# Patient Record
Sex: MALE | Race: ASIAN | NOT HISPANIC OR LATINO | Employment: FULL TIME | URBAN - METROPOLITAN AREA
[De-identification: names, ages, dates, MRNs, and addresses within clinical notes are randomized per-mention and may not be internally consistent; named-entity substitution may affect disease eponyms.]

---

## 2023-01-15 ENCOUNTER — TELEPHONE (OUTPATIENT)
Dept: SCHEDULING | Facility: IMAGING CENTER | Age: 33
End: 2023-01-15
Payer: COMMERCIAL

## 2023-01-16 ENCOUNTER — OFFICE VISIT (OUTPATIENT)
Dept: MEDICAL GROUP | Facility: PHYSICIAN GROUP | Age: 33
End: 2023-01-16
Payer: COMMERCIAL

## 2023-01-16 ENCOUNTER — HOSPITAL ENCOUNTER (OUTPATIENT)
Dept: LAB | Facility: MEDICAL CENTER | Age: 33
End: 2023-01-16
Payer: COMMERCIAL

## 2023-01-16 VITALS
WEIGHT: 139 LBS | HEIGHT: 68 IN | OXYGEN SATURATION: 96 % | HEART RATE: 77 BPM | SYSTOLIC BLOOD PRESSURE: 104 MMHG | BODY MASS INDEX: 21.07 KG/M2 | TEMPERATURE: 98.2 F | DIASTOLIC BLOOD PRESSURE: 72 MMHG

## 2023-01-16 DIAGNOSIS — M25.60 JOINT STIFFNESS: ICD-10-CM

## 2023-01-16 DIAGNOSIS — M25.542 ARTHRALGIA OF BOTH HANDS: ICD-10-CM

## 2023-01-16 DIAGNOSIS — M25.541 ARTHRALGIA OF BOTH HANDS: ICD-10-CM

## 2023-01-16 DIAGNOSIS — Z00.00 ENCOUNTER FOR MEDICAL EXAMINATION TO ESTABLISH CARE: ICD-10-CM

## 2023-01-16 DIAGNOSIS — M25.532 ARTHRALGIA OF LEFT WRIST: ICD-10-CM

## 2023-01-16 PROBLEM — M25.50 JOINT PAIN: Status: ACTIVE | Noted: 2023-01-16

## 2023-01-16 LAB
25(OH)D3 SERPL-MCNC: 10 NG/ML (ref 30–100)
ALBUMIN SERPL BCP-MCNC: 4.6 G/DL (ref 3.2–4.9)
ALBUMIN/GLOB SERPL: 1.5 G/DL
ALP SERPL-CCNC: 88 U/L (ref 30–99)
ALT SERPL-CCNC: 19 U/L (ref 2–50)
ANION GAP SERPL CALC-SCNC: 9 MMOL/L (ref 7–16)
AST SERPL-CCNC: 20 U/L (ref 12–45)
BASOPHILS # BLD AUTO: 1 % (ref 0–1.8)
BASOPHILS # BLD: 0.08 K/UL (ref 0–0.12)
BILIRUB SERPL-MCNC: 0.3 MG/DL (ref 0.1–1.5)
BUN SERPL-MCNC: 15 MG/DL (ref 8–22)
CALCIUM ALBUM COR SERPL-MCNC: 9.3 MG/DL (ref 8.5–10.5)
CALCIUM SERPL-MCNC: 9.8 MG/DL (ref 8.5–10.5)
CHLORIDE SERPL-SCNC: 102 MMOL/L (ref 96–112)
CHOLEST SERPL-MCNC: 172 MG/DL (ref 100–199)
CO2 SERPL-SCNC: 27 MMOL/L (ref 20–33)
CREAT SERPL-MCNC: 0.94 MG/DL (ref 0.5–1.4)
EOSINOPHIL # BLD AUTO: 0.32 K/UL (ref 0–0.51)
EOSINOPHIL NFR BLD: 4.1 % (ref 0–6.9)
ERYTHROCYTE [DISTWIDTH] IN BLOOD BY AUTOMATED COUNT: 37 FL (ref 35.9–50)
GFR SERPLBLD CREATININE-BSD FMLA CKD-EPI: 109 ML/MIN/1.73 M 2
GLOBULIN SER CALC-MCNC: 3.1 G/DL (ref 1.9–3.5)
GLUCOSE SERPL-MCNC: 93 MG/DL (ref 65–99)
HCT VFR BLD AUTO: 46.9 % (ref 42–52)
HDLC SERPL-MCNC: 39 MG/DL
HGB BLD-MCNC: 15.5 G/DL (ref 14–18)
IMM GRANULOCYTES # BLD AUTO: 0.04 K/UL (ref 0–0.11)
IMM GRANULOCYTES NFR BLD AUTO: 0.5 % (ref 0–0.9)
LDLC SERPL CALC-MCNC: 104 MG/DL
LYMPHOCYTES # BLD AUTO: 2.81 K/UL (ref 1–4.8)
LYMPHOCYTES NFR BLD: 35.8 % (ref 22–41)
MCH RBC QN AUTO: 27.9 PG (ref 27–33)
MCHC RBC AUTO-ENTMCNC: 33 G/DL (ref 33.7–35.3)
MCV RBC AUTO: 84.5 FL (ref 81.4–97.8)
MONOCYTES # BLD AUTO: 0.76 K/UL (ref 0–0.85)
MONOCYTES NFR BLD AUTO: 9.7 % (ref 0–13.4)
NEUTROPHILS # BLD AUTO: 3.85 K/UL (ref 1.82–7.42)
NEUTROPHILS NFR BLD: 48.9 % (ref 44–72)
NRBC # BLD AUTO: 0 K/UL
NRBC BLD-RTO: 0 /100 WBC
PLATELET # BLD AUTO: 318 K/UL (ref 164–446)
PMV BLD AUTO: 10.2 FL (ref 9–12.9)
POTASSIUM SERPL-SCNC: 4.2 MMOL/L (ref 3.6–5.5)
PROT SERPL-MCNC: 7.7 G/DL (ref 6–8.2)
RBC # BLD AUTO: 5.55 M/UL (ref 4.7–6.1)
RHEUMATOID FACT SER IA-ACNC: <10 IU/ML (ref 0–14)
SODIUM SERPL-SCNC: 138 MMOL/L (ref 135–145)
TRIGL SERPL-MCNC: 144 MG/DL (ref 0–149)
TSH SERPL DL<=0.005 MIU/L-ACNC: 0.99 UIU/ML (ref 0.38–5.33)
WBC # BLD AUTO: 7.9 K/UL (ref 4.8–10.8)

## 2023-01-16 PROCEDURE — 82306 VITAMIN D 25 HYDROXY: CPT

## 2023-01-16 PROCEDURE — 86431 RHEUMATOID FACTOR QUANT: CPT

## 2023-01-16 PROCEDURE — 99204 OFFICE O/P NEW MOD 45 MIN: CPT

## 2023-01-16 PROCEDURE — 80061 LIPID PANEL: CPT

## 2023-01-16 PROCEDURE — 36415 COLL VENOUS BLD VENIPUNCTURE: CPT

## 2023-01-16 PROCEDURE — 85025 COMPLETE CBC W/AUTO DIFF WBC: CPT

## 2023-01-16 PROCEDURE — 84443 ASSAY THYROID STIM HORMONE: CPT

## 2023-01-16 PROCEDURE — 86200 CCP ANTIBODY: CPT

## 2023-01-16 PROCEDURE — 80053 COMPREHEN METABOLIC PANEL: CPT

## 2023-01-16 RX ORDER — MELOXICAM 7.5 MG/1
7.5 TABLET ORAL DAILY
Qty: 90 TABLET | Refills: 0 | Status: SHIPPED | OUTPATIENT
Start: 2023-01-16 | End: 2023-09-12

## 2023-01-16 SDOH — ECONOMIC STABILITY: FOOD INSECURITY: WITHIN THE PAST 12 MONTHS, YOU WORRIED THAT YOUR FOOD WOULD RUN OUT BEFORE YOU GOT MONEY TO BUY MORE.: NEVER TRUE

## 2023-01-16 SDOH — HEALTH STABILITY: MENTAL HEALTH
STRESS IS WHEN SOMEONE FEELS TENSE, NERVOUS, ANXIOUS, OR CAN'T SLEEP AT NIGHT BECAUSE THEIR MIND IS TROUBLED. HOW STRESSED ARE YOU?: NOT AT ALL

## 2023-01-16 SDOH — ECONOMIC STABILITY: TRANSPORTATION INSECURITY
IN THE PAST 12 MONTHS, HAS THE LACK OF TRANSPORTATION KEPT YOU FROM MEDICAL APPOINTMENTS OR FROM GETTING MEDICATIONS?: NO

## 2023-01-16 SDOH — ECONOMIC STABILITY: FOOD INSECURITY: WITHIN THE PAST 12 MONTHS, THE FOOD YOU BOUGHT JUST DIDN'T LAST AND YOU DIDN'T HAVE MONEY TO GET MORE.: NEVER TRUE

## 2023-01-16 SDOH — ECONOMIC STABILITY: INCOME INSECURITY: HOW HARD IS IT FOR YOU TO PAY FOR THE VERY BASICS LIKE FOOD, HOUSING, MEDICAL CARE, AND HEATING?: NOT VERY HARD

## 2023-01-16 SDOH — ECONOMIC STABILITY: INCOME INSECURITY: IN THE LAST 12 MONTHS, WAS THERE A TIME WHEN YOU WERE NOT ABLE TO PAY THE MORTGAGE OR RENT ON TIME?: NO

## 2023-01-16 SDOH — ECONOMIC STABILITY: TRANSPORTATION INSECURITY
IN THE PAST 12 MONTHS, HAS LACK OF RELIABLE TRANSPORTATION KEPT YOU FROM MEDICAL APPOINTMENTS, MEETINGS, WORK OR FROM GETTING THINGS NEEDED FOR DAILY LIVING?: NO

## 2023-01-16 SDOH — HEALTH STABILITY: PHYSICAL HEALTH: ON AVERAGE, HOW MANY DAYS PER WEEK DO YOU ENGAGE IN MODERATE TO STRENUOUS EXERCISE (LIKE A BRISK WALK)?: 3 DAYS

## 2023-01-16 SDOH — ECONOMIC STABILITY: HOUSING INSECURITY
IN THE LAST 12 MONTHS, WAS THERE A TIME WHEN YOU DID NOT HAVE A STEADY PLACE TO SLEEP OR SLEPT IN A SHELTER (INCLUDING NOW)?: NO

## 2023-01-16 SDOH — HEALTH STABILITY: PHYSICAL HEALTH: ON AVERAGE, HOW MANY MINUTES DO YOU ENGAGE IN EXERCISE AT THIS LEVEL?: 20 MIN

## 2023-01-16 SDOH — ECONOMIC STABILITY: HOUSING INSECURITY: IN THE LAST 12 MONTHS, HOW MANY PLACES HAVE YOU LIVED?: 2

## 2023-01-16 SDOH — ECONOMIC STABILITY: TRANSPORTATION INSECURITY
IN THE PAST 12 MONTHS, HAS LACK OF TRANSPORTATION KEPT YOU FROM MEETINGS, WORK, OR FROM GETTING THINGS NEEDED FOR DAILY LIVING?: NO

## 2023-01-16 ASSESSMENT — SOCIAL DETERMINANTS OF HEALTH (SDOH)
DO YOU BELONG TO ANY CLUBS OR ORGANIZATIONS SUCH AS CHURCH GROUPS UNIONS, FRATERNAL OR ATHLETIC GROUPS, OR SCHOOL GROUPS?: NO
HOW OFTEN DO YOU HAVE A DRINK CONTAINING ALCOHOL: MONTHLY OR LESS
WITHIN THE PAST 12 MONTHS, YOU WORRIED THAT YOUR FOOD WOULD RUN OUT BEFORE YOU GOT THE MONEY TO BUY MORE: NEVER TRUE
HOW OFTEN DO YOU GET TOGETHER WITH FRIENDS OR RELATIVES?: ONCE A WEEK
HOW MANY DRINKS CONTAINING ALCOHOL DO YOU HAVE ON A TYPICAL DAY WHEN YOU ARE DRINKING: 1 OR 2
IN A TYPICAL WEEK, HOW MANY TIMES DO YOU TALK ON THE PHONE WITH FAMILY, FRIENDS, OR NEIGHBORS?: MORE THAN THREE TIMES A WEEK
HOW OFTEN DO YOU ATTENT MEETINGS OF THE CLUB OR ORGANIZATION YOU BELONG TO?: NEVER
HOW OFTEN DO YOU HAVE SIX OR MORE DRINKS ON ONE OCCASION: NEVER
HOW OFTEN DO YOU ATTENT MEETINGS OF THE CLUB OR ORGANIZATION YOU BELONG TO?: NEVER
HOW OFTEN DO YOU ATTEND CHURCH OR RELIGIOUS SERVICES?: 1 TO 4 TIMES PER YEAR
IN A TYPICAL WEEK, HOW MANY TIMES DO YOU TALK ON THE PHONE WITH FAMILY, FRIENDS, OR NEIGHBORS?: MORE THAN THREE TIMES A WEEK
HOW OFTEN DO YOU ATTEND CHURCH OR RELIGIOUS SERVICES?: 1 TO 4 TIMES PER YEAR
HOW OFTEN DO YOU GET TOGETHER WITH FRIENDS OR RELATIVES?: ONCE A WEEK
HOW HARD IS IT FOR YOU TO PAY FOR THE VERY BASICS LIKE FOOD, HOUSING, MEDICAL CARE, AND HEATING?: NOT VERY HARD
DO YOU BELONG TO ANY CLUBS OR ORGANIZATIONS SUCH AS CHURCH GROUPS UNIONS, FRATERNAL OR ATHLETIC GROUPS, OR SCHOOL GROUPS?: NO

## 2023-01-16 ASSESSMENT — LIFESTYLE VARIABLES
HOW OFTEN DO YOU HAVE SIX OR MORE DRINKS ON ONE OCCASION: NEVER
HOW MANY STANDARD DRINKS CONTAINING ALCOHOL DO YOU HAVE ON A TYPICAL DAY: 1 OR 2
HOW OFTEN DO YOU HAVE A DRINK CONTAINING ALCOHOL: MONTHLY OR LESS
AUDIT-C TOTAL SCORE: 1
SKIP TO QUESTIONS 9-10: 1

## 2023-01-16 ASSESSMENT — FIBROSIS 4 INDEX: FIB4 SCORE: 0.5

## 2023-01-16 ASSESSMENT — PATIENT HEALTH QUESTIONNAIRE - PHQ9: CLINICAL INTERPRETATION OF PHQ2 SCORE: 0

## 2023-01-16 NOTE — PROGRESS NOTES
"Subjective:     CC:    Chief Complaint   Patient presents with    Establish Care    Foot Problem     Pain on both feet x2 months        HISTORY OF THE PRESENT ILLNESS: Latosha is a pleasant 32 y.o. male here today to establish care and discuss joint pain.  Patient recently moved here from [place], [place] and does have documented concerns about arthritis in the past, however, patient has not been managed or treated for this.    Problem   Joint Pain    Arthritis runs in his family  Both parents have arthritis.   He feels pain in his fingers, calf, ankles, and toes - bilaterally.  Stiff in the morning  Worse when he moves around and when he stands in one place.   He doesn't notice joint swelling but states his MCP and PIP joints are occasionally red.  He does not take anything for this and is never tried anything.       Bmi 21.0-21.9, Adult    Physically active at work.  His wife cooks at home for him. He has a well-balanced diet. But he doesn't eat breakfast.            Health Maintenance: Completed    ROS:   All systems negative except as addressed in assessment and plan.         Objective:     Exam: /72 (BP Location: Right arm, Patient Position: Sitting, BP Cuff Size: Adult)   Pulse 77   Temp 36.8 °C (98.2 °F) (Temporal)   Ht 1.727 m (5' 8\")   Wt 63 kg (139 lb)   SpO2 96%  Body mass index is 21.13 kg/m².    Physical Exam  Constitutional:       Appearance: Normal appearance.   HENT:      Right Ear: Tympanic membrane normal.      Left Ear: Tympanic membrane normal.   Cardiovascular:      Rate and Rhythm: Normal rate.   Pulmonary:      Effort: Pulmonary effort is normal.      Breath sounds: Normal breath sounds.   Abdominal:      General: Bowel sounds are normal.      Palpations: Abdomen is soft.   Musculoskeletal:         General: Normal range of motion.   Neurological:      Mental Status: He is alert. Mental status is at baseline.   Psychiatric:         Mood and Affect: Mood normal.         Behavior: " Behavior normal.       Labs: Reviewed from 04/22/21      Assessment & Plan:   32 y.o. male with the following -    1. Encounter for medical examination to establish care  Health conditions and medications reviewed and updated. All screenings discussed and up-to-date. Health maintenance completed.     - Lipid Profile; Future  - Comp Metabolic Panel; Future  - CBC WITH DIFFERENTIAL; Future  - VITAMIN D,25 HYDROXY (DEFICIENCY); Future  - TSH WITH REFLEX TO FT4; Future    2. Arthralgia of left wrist  3. Arthralgia of both hands  4. Joint stiffness  New problem to provider, not controlled.  Suspect arthritis but will still run labs to rule out rheumatoid arthritis given the bilateral presentation.  Patient's blood pressure is controlled.  I will prescribe meloxicam to assess for relief.  - meloxicam (MOBIC) 7.5 MG Tab; Take 1 Tablet by mouth every day.  Dispense: 90 Tablet; Refill: 0  - RHEUMATOID ARTHRITIS FACTOR; Future  - CCP ANTIBODY; Future  - meloxicam (MOBIC) 7.5 MG Tab; Take 1 Tablet by mouth every day.  Dispense: 90 Tablet; Refill: 0    5. BMI 21.0-21.9, adult  Stable.  Body mass index is 21.13 kg/m².  Continue healthy diet and lifestyle.     Patient was educated in proper administration of medication(s) ordered today including safety, possible SE, risks, benefits, rationale and alternatives to therapy.   Supportive care, differential diagnoses, and indications for immediate follow-up discussed with patient.    Pathogenesis of diagnosis discussed including typical length and natural progression.    Instructed to return to clinic or nearest emergency department for any change in condition, further concerns, or worsening of symptoms.  Patient states understanding of the plan of care and discharge instructions.    Return in about 1 year (around 1/16/2024) for Wellness Visit.    I spent a total of 35 minutes with record review, exam, and communication with the patient, communication with other providers, and  documentation of this encounter. This does not include time spent on separately billable procedures/tests.    I have placed the above orders and discussed them with an approved delegating provider.  The MA is performing the below orders under the direction of Dr. Franco.    Please note that this dictation was created using voice recognition software. I have worked with consultants from the vendor as well as technical experts from [facility] to optimize the interface. I have made every reasonable attempt to correct obvious errors, but I expect that there are errors of grammar and possibly content that I did not discover before finalizing the note.

## 2023-01-18 LAB — CCP IGG SERPL-ACNC: 6 UNITS (ref 0–19)

## 2023-09-12 ENCOUNTER — OFFICE VISIT (OUTPATIENT)
Dept: MEDICAL GROUP | Facility: PHYSICIAN GROUP | Age: 33
End: 2023-09-12
Payer: COMMERCIAL

## 2023-09-12 VITALS
HEIGHT: 68 IN | SYSTOLIC BLOOD PRESSURE: 108 MMHG | TEMPERATURE: 97.6 F | HEART RATE: 73 BPM | OXYGEN SATURATION: 97 % | DIASTOLIC BLOOD PRESSURE: 72 MMHG | WEIGHT: 170 LBS | BODY MASS INDEX: 25.76 KG/M2

## 2023-09-12 DIAGNOSIS — Z31.41 ENCOUNTER FOR FERTILITY TESTING: ICD-10-CM

## 2023-09-12 DIAGNOSIS — Z00.00 WELLNESS EXAMINATION: ICD-10-CM

## 2023-09-12 DIAGNOSIS — E78.00 PURE HYPERCHOLESTEROLEMIA: ICD-10-CM

## 2023-09-12 DIAGNOSIS — R14.0 BLOATING: ICD-10-CM

## 2023-09-12 DIAGNOSIS — E66.3 OVERWEIGHT (BMI 25.0-29.9): ICD-10-CM

## 2023-09-12 DIAGNOSIS — E55.9 VITAMIN D DEFICIENCY: ICD-10-CM

## 2023-09-12 PROCEDURE — 3074F SYST BP LT 130 MM HG: CPT

## 2023-09-12 PROCEDURE — 99395 PREV VISIT EST AGE 18-39: CPT

## 2023-09-12 PROCEDURE — 3078F DIAST BP <80 MM HG: CPT

## 2023-09-12 ASSESSMENT — FIBROSIS 4 INDEX: FIB4 SCORE: 0.49

## 2023-09-12 NOTE — PROGRESS NOTES
"Subjective:     CC:   Chief Complaint   Patient presents with    Annual Wellness Visit       HISTORY OF THE PRESENT ILLNESS: Latosha is a pleasant 32 y.o. male here today for an annual wellness visit.  He also has concerns about increased bloating and is inquiring about food allergy testing.  He denies abdominal pain or change in bowel movements.  He has not noticed any food triggers.    He additionally is inquiring about fertility testing.  He and his wife have been trying for 2 years with no success.    Health Maintenance: Completed  Anticipatory Guidance  Diet: He states he has no self-control. He satisfies his cravings. He does incorporate vegetables. No fruit. Patient educated on healthy diet, low-fat, low-sugar.   Exercise: He does a lot of walking at work - approximately 50,000 miles a day.   Substance Abuse: Occasional   Safe in relationship.  Seat belts, bike helmet, gun safety discussed.  Sun protection used.  Dentist: Due  Eye Doctor: Completed    Cancer Screening:  N/A    Infectious Disease Screening/Immunizations:  Patient is in a monogamous relationship and has deferred STD testing today.  Immunizations:   Influenza: Due but not yet available   Tetanus: Completed    ROS:  All systems negative expect as addressed in assessment and plan.     Objective:     Exam:  /72 (BP Location: Right arm, Patient Position: Sitting, BP Cuff Size: Adult)   Pulse 73   Temp 36.4 °C (97.6 °F) (Temporal)   Ht 1.727 m (5' 8\")   Wt 77.1 kg (170 lb)   SpO2 97%   BMI 25.85 kg/m²  Body mass index is 25.85 kg/m².    Physical Exam  Constitutional:       Appearance: Normal appearance.   HENT:      Right Ear: Tympanic membrane normal.      Left Ear: Tympanic membrane normal.   Cardiovascular:      Rate and Rhythm: Normal rate.   Pulmonary:      Effort: Pulmonary effort is normal.      Breath sounds: Normal breath sounds.   Abdominal:      General: Bowel sounds are normal.      Palpations: Abdomen is soft. "   Musculoskeletal:         General: Normal range of motion.   Neurological:      Mental Status: He is alert. Mental status is at baseline.   Psychiatric:         Mood and Affect: Mood normal.         Behavior: Behavior normal.       Labs: Results reviewed from 01/16/23    Assessment & Plan:     32 y.o. male with the following -    1. Wellness examination  Health conditions and medications reviewed and updated. All screenings discussed and up-to-date. Health maintenance completed.     - TSH WITH REFLEX TO FT4; Future  - VITAMIN D,25 HYDROXY (DEFICIENCY); Future  - Lipid Profile; Future  - Comp Metabolic Panel; Future  - CBC WITH DIFFERENTIAL; Future  - HEMOGLOBIN A1C; Future    2. Vitamin D deficiency  - VITAMIN D,25 HYDROXY (DEFICIENCY); Future    3. Pure hypercholesterolemia  - Lipid Profile; Future    4. Overweight (BMI 25.0-29.9)  - Comp Metabolic Panel; Future  - HEMOGLOBIN A1C; Future    5. Encounter for fertility testing  - Referral to Other    Other orders  - VITAMIN D PO; Take  by mouth.  - Multiple Vitamin (MULTIVITAMIN PO); Take  by mouth.    Patient was educated in proper administration of medication(s) ordered today including safety, possible SE, risks, benefits, rationale and alternatives to therapy.   Supportive care, differential diagnoses, and indications for immediate follow-up discussed with patient.    Pathogenesis of diagnosis discussed including typical length and natural progression.    Instructed to return to clinic or nearest emergency department for any change in condition, further concerns, or worsening of symptoms.  Patient states understanding of the plan of care and discharge instructions.    Return in about 2 months (around 11/12/2023) for Establish with new PCP.    I have placed the above orders and discussed them with an approved delegating provider.  The MA is performing the below orders under the direction of Dr. Franco.    Please note that this dictation was created using voice  recognition software. I have worked with consultants from the vendor as well as technical experts from [facility] to optimize the interface. I have made every reasonable attempt to correct obvious errors, but I expect that there are errors of grammar and possibly content that I did not discover before finalizing the note.

## 2023-10-01 ENCOUNTER — DOCUMENTATION (OUTPATIENT)
Dept: HEALTH INFORMATION MANAGEMENT | Facility: OTHER | Age: 33
End: 2023-10-01
Payer: COMMERCIAL

## 2023-10-13 ENCOUNTER — HOSPITAL ENCOUNTER (OUTPATIENT)
Dept: LAB | Facility: MEDICAL CENTER | Age: 33
End: 2023-10-13
Payer: COMMERCIAL

## 2023-10-13 DIAGNOSIS — R14.0 BLOATING: ICD-10-CM

## 2023-10-13 DIAGNOSIS — E78.00 PURE HYPERCHOLESTEROLEMIA: ICD-10-CM

## 2023-10-13 DIAGNOSIS — E66.3 OVERWEIGHT (BMI 25.0-29.9): ICD-10-CM

## 2023-10-13 DIAGNOSIS — Z00.00 WELLNESS EXAMINATION: ICD-10-CM

## 2023-10-13 DIAGNOSIS — E55.9 VITAMIN D DEFICIENCY: ICD-10-CM

## 2023-10-13 LAB
25(OH)D3 SERPL-MCNC: 20 NG/ML (ref 30–100)
ALBUMIN SERPL BCP-MCNC: 4.9 G/DL (ref 3.2–4.9)
ALBUMIN/GLOB SERPL: 1.5 G/DL
ALP SERPL-CCNC: 82 U/L (ref 30–99)
ALT SERPL-CCNC: 23 U/L (ref 2–50)
ANION GAP SERPL CALC-SCNC: 8 MMOL/L (ref 7–16)
AST SERPL-CCNC: 23 U/L (ref 12–45)
BILIRUB SERPL-MCNC: 0.7 MG/DL (ref 0.1–1.5)
BUN SERPL-MCNC: 13 MG/DL (ref 8–22)
CALCIUM ALBUM COR SERPL-MCNC: 9 MG/DL (ref 8.5–10.5)
CALCIUM SERPL-MCNC: 9.7 MG/DL (ref 8.5–10.5)
CHLORIDE SERPL-SCNC: 103 MMOL/L (ref 96–112)
CHOLEST SERPL-MCNC: 169 MG/DL (ref 100–199)
CO2 SERPL-SCNC: 29 MMOL/L (ref 20–33)
CREAT SERPL-MCNC: 1.02 MG/DL (ref 0.5–1.4)
EST. AVERAGE GLUCOSE BLD GHB EST-MCNC: 117 MG/DL
FASTING STATUS PATIENT QL REPORTED: NORMAL
GFR SERPLBLD CREATININE-BSD FMLA CKD-EPI: 98 ML/MIN/1.73 M 2
GLOBULIN SER CALC-MCNC: 3.2 G/DL (ref 1.9–3.5)
GLUCOSE SERPL-MCNC: 95 MG/DL (ref 65–99)
HBA1C MFR BLD: 5.7 % (ref 4–5.6)
HDLC SERPL-MCNC: 35 MG/DL
LDLC SERPL CALC-MCNC: 87 MG/DL
POTASSIUM SERPL-SCNC: 4.2 MMOL/L (ref 3.6–5.5)
PROT SERPL-MCNC: 8.1 G/DL (ref 6–8.2)
SODIUM SERPL-SCNC: 140 MMOL/L (ref 135–145)
TRIGL SERPL-MCNC: 236 MG/DL (ref 0–149)
TSH SERPL DL<=0.005 MIU/L-ACNC: 1.2 UIU/ML (ref 0.38–5.33)

## 2023-10-13 PROCEDURE — 80061 LIPID PANEL: CPT

## 2023-10-13 PROCEDURE — 83036 HEMOGLOBIN GLYCOSYLATED A1C: CPT

## 2023-10-13 PROCEDURE — 86003 ALLG SPEC IGE CRUDE XTRC EA: CPT | Mod: 91

## 2023-10-13 PROCEDURE — 80053 COMPREHEN METABOLIC PANEL: CPT

## 2023-10-13 PROCEDURE — 86001 ALLERGEN SPECIFIC IGG: CPT

## 2023-10-13 PROCEDURE — 36415 COLL VENOUS BLD VENIPUNCTURE: CPT

## 2023-10-13 PROCEDURE — 85025 COMPLETE CBC W/AUTO DIFF WBC: CPT

## 2023-10-13 PROCEDURE — 82784 ASSAY IGA/IGD/IGG/IGM EACH: CPT

## 2023-10-13 PROCEDURE — 84443 ASSAY THYROID STIM HORMONE: CPT

## 2023-10-13 PROCEDURE — 86364 TISS TRNSGLTMNASE EA IG CLAS: CPT

## 2023-10-13 PROCEDURE — 82306 VITAMIN D 25 HYDROXY: CPT

## 2023-10-14 LAB
BASOPHILS # BLD AUTO: 1.1 % (ref 0–1.8)
BASOPHILS # BLD: 0.09 K/UL (ref 0–0.12)
EOSINOPHIL # BLD AUTO: 0.44 K/UL (ref 0–0.51)
EOSINOPHIL NFR BLD: 5.3 % (ref 0–6.9)
ERYTHROCYTE [DISTWIDTH] IN BLOOD BY AUTOMATED COUNT: 38.1 FL (ref 35.9–50)
GLUTEN IGG-MCNC: 55.6 MCG/ML
HCT VFR BLD AUTO: 52.4 % (ref 42–52)
HGB BLD-MCNC: 17.3 G/DL (ref 14–18)
IGA SERPL-MCNC: 273 MG/DL (ref 68–408)
IMM GRANULOCYTES # BLD AUTO: 0.02 K/UL (ref 0–0.11)
IMM GRANULOCYTES NFR BLD AUTO: 0.2 % (ref 0–0.9)
LYMPHOCYTES # BLD AUTO: 2.78 K/UL (ref 1–4.8)
LYMPHOCYTES NFR BLD: 33.7 % (ref 22–41)
MCH RBC QN AUTO: 28.5 PG (ref 27–33)
MCHC RBC AUTO-ENTMCNC: 33 G/DL (ref 32.3–36.5)
MCV RBC AUTO: 86.3 FL (ref 81.4–97.8)
MONOCYTES # BLD AUTO: 0.67 K/UL (ref 0–0.85)
MONOCYTES NFR BLD AUTO: 8.1 % (ref 0–13.4)
NEUTROPHILS # BLD AUTO: 4.26 K/UL (ref 1.82–7.42)
NEUTROPHILS NFR BLD: 51.6 % (ref 44–72)
NRBC # BLD AUTO: 0 K/UL
NRBC BLD-RTO: 0 /100 WBC (ref 0–0.2)
PLATELET # BLD AUTO: 318 K/UL (ref 164–446)
PMV BLD AUTO: 9.7 FL (ref 9–12.9)
RBC # BLD AUTO: 6.07 M/UL (ref 4.7–6.1)
TTG IGA SER IA-ACNC: 99 U/ML (ref 0–3)
WBC # BLD AUTO: 8.3 K/UL (ref 4.8–10.8)

## 2023-10-15 LAB
ALMOND IGE QN: <0.1 KU/L
ASPARAGUS IGE QN: <0.1 KU/L
AVOCADO IGE QN: <0.1 KU/L
BAKER'S YEAST IGE QN: 0.2 KU/L
BANANA IGE QN: 0.2 KU/L
BASIL IGE QN: <0.1 KU/L
BAYLEAF IGE QN: <0.1 KU/L
BEEF IGE QN: <0.1 KU/L
BEET IGE QN: <0.1 KU/L
BELL PEPPER IGE QN: <0.1 KU/L
BLACK PEPPER IGE QN: <0.1 KU/L
BLUE MUSSEL IGE QN: <0.1 KU/L
BLUEBERRY IGE QN: <0.1 KU/L
BRAZIL NUT IGE QN: <0.1 KU/L
BROCCOLI IGE QN: <0.1 KU/L
BUCKWHEAT IGE QN: <0.1 KU/L
CABBAGE IGE QN: <0.1 KU/L
CARROT IGE QN: <0.1 KU/L
CASHEW NUT IGE QN: <0.1 KU/L
CHESTNUT IGE QN: <0.1 KU/L
CHICKPEA IGE AB [UNITS/VOLUME] IN SERUM: 0.11 KU/L
CHOCOLATE IGE QN: <0.1 KU/L
CINNAMON IGE QN: <0.1 KU/L
CLAM IGE QN: <0.1 KU/L
COCONUT IGE QN: 0.11 KU/L
CODFISH IGE QN: <0.1 KU/L
COW MILK IGE QN: 0.43 KU/L
CRAB IGE QN: <0.1 KU/L
CUCUMBER IGE QN: 0.19 KU/L
CULTIVATED COTTON IGE QN: <0.1 KU/L
DEPRECATED MISC ALLERGEN IGE RAST QL: ABNORMAL
DILL IGE QN: <0.1 KU/L
EGG WHITE IGE QN: 0.1 KU/L
GINGER IGE QN: 0.11 KU/L
GRAPE IGE QN: <0.1 KU/L
HALIBUT IGE QN: <0.1 KU/L
HAZELNUT IGE QN: <0.1 KU/L
LETTUCE IGE QN: <0.1 KU/L
LIMA BEAN IGE QN: 0.1 KU/L
MELON IGE QN: <0.1 KU/L
ONION IGE QN: 0.15 KU/L
ORANGE IGE QN: <0.1 KU/L
OREGANO IGE QN: <0.1 KU/L
PEA IGE QN: <0.1 KU/L
PEANUT IGE QN: 0.12 KU/L
PEAR IGE QN: 0.1 KU/L
PLUM IGE QN: <0.1 KU/L
PORK IGE QN: <0.1 KU/L
POTATO IGE QN: <0.1 KU/L
RASPBERRY IGE QN: <0.1 KU/L
SESAME SEED IGE QN: 0.12 KU/L
SHRIMP IGE QN: <0.1 KU/L
SOYBEAN IGE QN: <0.1 KU/L
TEA IGE QN: <0.1 KU/L
TOMATO IGE QN: 0.19 KU/L
TROUT IGE QN: <0.1 KU/L
TURKEY MEAT IGE QN: <0.1 KU/L
WALNUT IGE QN: <0.1 KU/L
WATERMELON IGE QN: 0.16 KU/L

## 2023-10-19 DIAGNOSIS — K90.0 CELIAC DISEASE: ICD-10-CM

## 2023-11-07 ENCOUNTER — OFFICE VISIT (OUTPATIENT)
Dept: MEDICAL GROUP | Facility: PHYSICIAN GROUP | Age: 33
End: 2023-11-07
Payer: COMMERCIAL

## 2023-11-07 VITALS
HEART RATE: 69 BPM | OXYGEN SATURATION: 99 % | HEIGHT: 68 IN | DIASTOLIC BLOOD PRESSURE: 80 MMHG | SYSTOLIC BLOOD PRESSURE: 114 MMHG | BODY MASS INDEX: 25.16 KG/M2 | RESPIRATION RATE: 16 BRPM | WEIGHT: 166 LBS | TEMPERATURE: 97.7 F

## 2023-11-07 DIAGNOSIS — K90.0 CELIAC DISEASE: ICD-10-CM

## 2023-11-07 DIAGNOSIS — Z76.89 ENCOUNTER TO ESTABLISH CARE: ICD-10-CM

## 2023-11-07 DIAGNOSIS — R73.03 PREDIABETES: ICD-10-CM

## 2023-11-07 DIAGNOSIS — E55.9 VITAMIN D DEFICIENCY: ICD-10-CM

## 2023-11-07 DIAGNOSIS — E78.2 MIXED HYPERLIPIDEMIA: ICD-10-CM

## 2023-11-07 PROCEDURE — 99214 OFFICE O/P EST MOD 30 MIN: CPT

## 2023-11-07 PROCEDURE — 3074F SYST BP LT 130 MM HG: CPT

## 2023-11-07 PROCEDURE — 3079F DIAST BP 80-89 MM HG: CPT

## 2023-11-07 ASSESSMENT — ENCOUNTER SYMPTOMS
ABDOMINAL PAIN: 0
DIARRHEA: 0

## 2023-11-07 ASSESSMENT — FIBROSIS 4 INDEX: FIB4 SCORE: 0.51

## 2023-11-07 NOTE — ASSESSMENT & PLAN NOTE
Chronic, unstable. Discussed healthy lifestyle recommendations.   The ASCVD Risk score (Clarice MEDRANO, et al., 2019) failed to calculate.

## 2023-11-07 NOTE — PROGRESS NOTES
"Subjective:     CC: Diagnoses of Encounter to establish care, Celiac disease, Vitamin D deficiency, Mixed hyperlipidemia, and Prediabetes were pertinent to this visit.    Pt presents accompanied by spouse to establish care with me, prior pcp Vel patton.   Working full time with [employer].   HPI:   Latosha presents today with    Problem   Vitamin D Deficiency   Mixed Hyperlipidemia   Prediabetes   Celiac Disease     ROS:  Review of Systems   Gastrointestinal:  Negative for abdominal pain and diarrhea.   All other systems reviewed and are negative.      Objective:     Exam:  /80 (BP Location: Left arm, Patient Position: Sitting, BP Cuff Size: Small adult)   Pulse 69   Temp 36.5 °C (97.7 °F) (Temporal)   Resp 16   Ht 1.727 m (5' 8\")   Wt 75.3 kg (166 lb)   SpO2 99%   BMI 25.24 kg/m²  Body mass index is 25.24 kg/m².    Physical Exam  Vitals reviewed.   Constitutional:       General: He is not in acute distress.     Appearance: Normal appearance. He is not ill-appearing.   HENT:      Head: Normocephalic and atraumatic.   Cardiovascular:      Rate and Rhythm: Normal rate.      Pulses: Normal pulses.   Pulmonary:      Effort: Pulmonary effort is normal. No respiratory distress.   Skin:     General: Skin is warm and dry.      Findings: No rash.   Neurological:      General: No focal deficit present.      Mental Status: He is alert and oriented to person, place, and time.   Psychiatric:         Mood and Affect: Mood normal.         Behavior: Behavior normal.         Labs:    Latest Reference Range & Units 10/13/23 07:53   WBC 4.8 - 10.8 K/uL 8.3   RBC 4.70 - 6.10 M/uL 6.07   Hemoglobin 14.0 - 18.0 g/dL 17.3   Hematocrit 42.0 - 52.0 % 52.4 (H)   MCV 81.4 - 97.8 fL 86.3   MCH 27.0 - 33.0 pg 28.5   MCHC 32.3 - 36.5 g/dL 33.0   RDW 35.9 - 50.0 fL 38.1   Platelet Count 164 - 446 K/uL 318   MPV 9.0 - 12.9 fL 9.7   Neutrophils-Polys 44.00 - 72.00 % 51.60   Neutrophils (Absolute) 1.82 - 7.42 K/uL 4.26   Lymphocytes " 22.00 - 41.00 % 33.70   Lymphs (Absolute) 1.00 - 4.80 K/uL 2.78   Monocytes 0.00 - 13.40 % 8.10   Monos (Absolute) 0.00 - 0.85 K/uL 0.67   Eosinophils 0.00 - 6.90 % 5.30   Eos (Absolute) 0.00 - 0.51 K/uL 0.44   Basophils 0.00 - 1.80 % 1.10   Baso (Absolute) 0.00 - 0.12 K/uL 0.09   Immature Granulocytes 0.00 - 0.90 % 0.20   Immature Granulocytes (abs) 0.00 - 0.11 K/uL 0.02   Nucleated RBC 0.00 - 0.20 /100 WBC 0.00   NRBC (Absolute) K/uL 0.00   Sodium 135 - 145 mmol/L 140   Potassium 3.6 - 5.5 mmol/L 4.2   Chloride 96 - 112 mmol/L 103   Co2 20 - 33 mmol/L 29   Anion Gap 7.0 - 16.0  8.0   Glucose 65 - 99 mg/dL 95   Bun 8 - 22 mg/dL 13   Creatinine 0.50 - 1.40 mg/dL 1.02   GFR (CKD-EPI) >60 mL/min/1.73 m 2 98   Calcium 8.5 - 10.5 mg/dL 9.7   Correct Calcium 8.5 - 10.5 mg/dL 9.0   AST(SGOT) 12 - 45 U/L 23   ALT(SGPT) 2 - 50 U/L 23   Alkaline Phosphatase 30 - 99 U/L 82   Total Bilirubin 0.1 - 1.5 mg/dL 0.7   Albumin 3.2 - 4.9 g/dL 4.9   Total Protein 6.0 - 8.2 g/dL 8.1   Globulin 1.9 - 3.5 g/dL 3.2   A-G Ratio g/dL 1.5   Glycohemoglobin 4.0 - 5.6 % 5.7 (H)   Estim. Avg Glu mg/dL 117   Fasting Status  Fasting   Cholesterol,Tot 100 - 199 mg/dL 169   Triglycerides 0 - 149 mg/dL 236 (H)   HDL >=40 mg/dL 35 !   LDL <100 mg/dL 87   25-Hydroxy   Vitamin D 25 30 - 100 ng/mL 20 (L)   Immunoglobulin A 68 - 408 mg/dL 273   t-TG IgA 0 - 3 U/mL 99 (H)   TSH 0.380 - 5.330 uIU/mL 1.200   [place], IgE <=0.34 kU/L <0.10   Asparagus  F261 <=0.34 kU/L <0.10   F138 Avocado <=0.34 kU/L <0.10   Baker/Means Yeast IgE <=0.34 kU/L 0.20   F204 Banana <=0.34 kU/L 0.20   Basil  F269 <=0.34 kU/L <0.10   [place]   F278 <=0.34 kU/L <0.10   F27 Beef <=0.34 kU/L <0.10   Beet Root, IgE <=0.34 kU/L <0.10   Black Pepper  F280 <=0.34 kU/L <0.10   Blueberry  F288 <=0.34 kU/L <0.10   Brazil Nut  F018 <=0.34 kU/L <0.10   Broccoli  F260 <=0.34 kU/L <0.10   Buckwheat  F011 <=0.34 kU/L <0.10   Cabbage F216 <=0.34 kU/L <0.10   F31 Carrot <=0.34 kU/L <0.10   Cashew  Nut  F202 <=0.34 kU/L <0.10   Chick Pea  F309 <=0.34 kU/L 0.11   Chocolate [place], IgE F093 <=0.34 kU/L <0.10   Cinnamon  F220 <=0.34 kU/L <0.10   Clam  F207 <=0.34 kU/L <0.10   Coconut <=0.34 kU/L 0.11   Codfish Allergen <=0.34 kU/L <0.10   Crab  F023 <=0.34 kU/L <0.10   Cucumber  F244 <=0.34 kU/L 0.19   Dill  F277 <=0.34 kU/L <0.10   F1 Eggwhite <=0.34 kU/L 0.10   Sravanthi  F270 <=0.34 kU/L 0.11   Gluten IgG <=47.40 mcg/mL 55.60 (H)   Grape  F259 <=0.34 kU/L <0.10   Halibut  F303 <=0.34 kU/L <0.10   Hazelnut/Filbert  F017 <=0.34 kU/L <0.10   Honeydew/Cantaloupe, IgE <=0.34 kU/L <0.10   Immunocap Score  See Note   Lettuce F215 <=0.34 kU/L <0.10   [place], IgE <=0.34 kU/L 0.10   F2 Milk <=0.34 kU/L 0.43 (H)   Mussel F037 <=0.34 kU/L <0.10   Occupational Cotton Seed, IgE <=0.34 kU/L <0.10   Onions  F048 <=0.34 kU/L 0.15   Orange F033 <=0.34 kU/L <0.10   Oregano  F283 <=0.34 kU/L <0.10   F12 Pea <=0.34 kU/L <0.10   F013 Peanut <=0.34 kU/L 0.12   Pear  F094 <=0.34 kU/L 0.10   Pepper C. annuum, IgE <=0.34 kU/L <0.10   Plum  F255 <=0.34 kU/L <0.10   Pork  F026 <=0.34 kU/L <0.10   Potato <=0.34 kU/L <0.10   Raspberry, IgE F343 <=0.34 kU/L <0.10   Sesame Seed <=0.34 kU/L 0.12   Shrimp IgE <=0.34 kU/L <0.10   F14 Bean Soybean <=0.34 kU/L <0.10   F299 Sweet [place] <=0.34 kU/L <0.10   Tea  F222 <=0.34 kU/L <0.10   Tomato IgE <=0.34 kU/L 0.19   Trout  F204 <=0.34 kU/L <0.10   Turkey F284 <=0.34 kU/L <0.10   [place]  F255 <=0.34 kU/L <0.10   Watermelon <=0.34 kU/L 0.16   (H): Data is abnormally high  !: Data is abnormal  (L): Data is abnormally low    Assessment & Plan:     32 y.o. male with the following -     Problem List Items Addressed This Visit       Celiac disease     Chronic, improved with dietary changes, eliminating gluten and dairy has reduced symptoms.          Vitamin D deficiency     Chronic, unstable. Recommend increasing once daily vit d3 otc paired with calcium to optimize absorption.            Mixed hyperlipidemia     Chronic, unstable. Discussed healthy lifestyle recommendations.   The ASCVD Risk score (Clarice DK, et al., 2019) failed to calculate.           Prediabetes     Chronic, unstable. Discussed healthy lifestyle recommendations. Plan to recheck 6-12 months          Other Visit Diagnoses       Encounter to establish care              Patient was educated in proper administration of medication(s) ordered today including safety, possible SE, risks, benefits, rationale and alternatives to therapy.   Supportive care, differential diagnoses, and indications for immediate follow-up discussed with patient.    Pathogenesis of diagnosis discussed including typical length and natural progression.    Instructed to return to clinic or nearest emergency department for any change in condition, further concerns, or worsening of symptoms.  Patient states understanding of the plan of care and discharge instructions.    Return in about 6 months (around 5/7/2024), or if symptoms worsen or fail to improve, for wellness, A1C.    Please note that this dictation was created using voice recognition software. I have made every reasonable attempt to correct obvious errors, but I expect that there are errors of grammar and possibly content that I did not discover before finalizing the note.

## 2023-11-07 NOTE — PATIENT INSTRUCTIONS
"Cholesterol-lowering supplements may be helpful  Diet and exercise are proven ways to reduce cholesterol. Cholesterol-lowering supplements may help, too.  By [facility] Staff  If you're worried about your cholesterol level and have started exercising and eating healthier foods, you might wonder if a dietary supplement could help. With your doctor's OK, here are some cholesterol-improving supplements to consider.  Cholesterol-improving supplement What it might do Side effects and drug interactions   Berberine May reduce low-density lipoprotein (LDL, or \"bad\") cholesterol and triglycerides May cause diarrhea, constipation, gas, nausea or vomiting; may cause harm to babies during pregnancy and breastfeeding   Fish oil May reduce triglycerides May cause a fishy aftertaste, bad breath, gas, nausea, vomiting or diarrhea; may interact with some blood-thinning medications   Flaxseed, ground May reduce LDL cholesterol May cause gas, bloating or diarrhea; may interact with some blood-thinning medications   Garlic May slightly reduce cholesterol but studies have been conflicting May cause bad breath, body odor, nausea, vomiting and gas; may interact with some blood-thinning medications   Green tea or green tea extract May lower LDL cholesterol May cause nausea, vomiting, gas or diarrhea; may interact with blood-thinning medications   Niacin May lower LDL cholesterol and triglycerides; may improve high-density lipoprotein (HDL, or \"good\") cholesterol May cause itching and flushing, which are more common at the higher doses usually needed to have an effect on cholesterol   Plant stanols and sterols May reduce LDL cholesterol, particularly in people with a genetic condition that causes high cholesterol (familial hypercholesterolemia) May cause diarrhea   Red yeast rice -- Natural doesn't mean safe  Some red yeast rice products contain a substance (monacolin K) that is chemically identical to the active ingredient in lovastatin " (Altoprev), a prescription medication that lowers cholesterol. Because there is variability in quality from , the amount of monacolin K can vary widely from product to product.  Products that contain monacolin K can cause the same types of side effects as lovastatin, which include damage to the muscles, kidneys and liver. In the [place], the Food and Drug Administration has ruled that dietary supplements that contain more than trace amounts of monacolin K are unapproved drugs and can't be sold legally as dietary supplements.  Dietary supplements may not be enough  While dietary supplements can help, you might also need prescription medications to get your cholesterol numbers to a safe level. Be sure to tell your doctor if you take any type of dietary supplement, because some can interact with medications you may be taking.   [facility]

## 2023-11-07 NOTE — ASSESSMENT & PLAN NOTE
Chronic, unstable. Recommend increasing once daily vit d3 otc paired with calcium to optimize absorption.